# Patient Record
(demographics unavailable — no encounter records)

---

## 2025-01-29 NOTE — PROCEDURE
[de-identified] : Injection: Right wrist. Indication: De Quervain's tenosynovitis.  A discussion was had with the patient regarding this procedure and all questions were answered. All risks, benefits and alternatives were discussed. These included but were not limited to bleeding, infection, and allergic reaction. A timeout was done to ensure correct side and patient agreed to the procedure.  A [place] neva was created on the skin utilizing a plastic needle cap to neva the anticipated point of entry.   Alcohol was used to clean the skin, and Betadine was used to sterilize and prep the area over the radial aspect of the wrist overlying the first extensor tendon compartment encompassing the APL and EPB.  Ethyl chloride spray was then used as a topical anesthetic. A 25-gauge needle was used to inject 0.5cc of 0.25% bupivacaine and 1cc of 6mg/mL betamethasone into the first extensor compartment. A sterile bandage was then applied. The patient tolerated the procedure well and there were no complications.

## 2025-01-29 NOTE — DISCUSSION/SUMMARY
[de-identified] : Discussed findings of today's exam and possible causes of patient's pain.  Educated patient on their most probable diagnosis of Right wrist De Quervain's tenosynovitis.  Patient has a 4-month old baby and also works as an ultrasonographer.  Reviewed possible courses of treatment, and we collaboratively decided best course of treatment at this time will include cortisone injection today (see procedure note).  Informed the patient that the numbing medicine in today's injection will last for about 4-6 hours. The steroid that was injected will start to work in 1 to 2 days, peak at 1-2 weeks, and may last up to 4-6 weeks. Discussed with the patient the expected course of this injury, and the variable response to cortisone injections. Sometimes it is relieved with a single injection, while others require repeat injections.  We will wait and see how patient responds to this one cortisone injection, may consider repeat injection in 6 weeks if issue is improved but not fully resolved. Patient may consider consultation with hand/wrist surgeon to have a discussion about repeat cortisone injections, versus possible surgical intervention.  Patient advised to wear a thumb spica splint every evening and as tolerate during the day.  Based on the patient's symptoms today, no hand/wrist therapy will likely be needed.   Patient appreciates and agrees with current plan.  This note was generated using dragon medical dictation software.  A reasonable effort has been made for proofreading its contents, but typos may still remain.  If there are any questions or points of clarification needed please notify my office.

## 2025-01-29 NOTE — HISTORY OF PRESENT ILLNESS
[de-identified] : 32 F RHD presents with right wrist pain since 24, she endorses some mild symptoms prior but exacerbated on this day Patient works as an ultrasound technician at [employer] pain first started while performing ultrasounds She denies any LESLIE but has a  child that she holds, she does also report an instance of pushing her  for fun and felt pain radiate from her thumb up her arm She endorses right thumb pain that radiates up her radial forearm She was evaluated at urgent care 25, she was told that she has tendonitis and was given thumb spica splint She has been taking ibuprofin 800mg

## 2025-01-29 NOTE — PHYSICAL EXAM
[de-identified] : Constitutional: Well-nourished, well-developed, No acute distress Respiratory:  Good respiratory effort, no SOB Psychiatric: Pleasant and normal affect, alert and oriented x3 Musculoskeletal: normal except where as noted in regional exam  Right Wrist: APPEARANCE: no marked deformities, no swelling or malalignment POSITIVE TENDERNESS: radial wrist and thumb along compartment of APL and EPB NONTENDER: snuffbox, scapholunate, TFCC, FCU/FCR, ECU/ECRL/ECRB, radial styloid, CMC, MCP.  ROM: full although painful in ulnar deviation, and thumb flexion, otherwise painless.  RESISTIVE TESTING: Painful resisted radial deviation, thumb extension, and thumb abduction, mild pain with wrist ext, painless resisted flex and ulnar deviation.  SPECIAL TESTS: + Finkelstein's. neg Phalen's. neg reverse Phalen's. neg Cross's. neg leola test. neg TFCC grind. neg tinel's at the carpal tunnel Vasc: 2+ radial pulse Neuro: AIN, PIN, Ulnar nerve intact to motor Sensation: Intact to light touch throughout [de-identified] : 	 		 	 Exam requested by: SERENTIY GUSTAFSON [address] [facility] [id number] [facility] PHONE: [phone] Patient: DIAMOND DAY YOB: 1992 Phone: [phone] MRN: [id number] Acc: [id number] Date of Exam: 01-   EXAM:  X-RAY RIGHT WRIST MINIMUM 3 VIEWS  HISTORY:  Wrist pain.  TECHNIQUE:  PA, lateral, and oblique views of the right wrist.  COMPARISON: 2/5/2015.  IMPRESSION:   Bone density appears normal.  No visible fracture.  Mild, 3 mm widening of the scapholunate interval. The rest of the joint spaces are preserved.  No osteophytes or erosions.  No discrete lytic or blastic bone lesion. Neutral ulnar variance.   The soft tissue is unremarkable.    Thank you for the opportunity to participate in the care of this patient.     KATE ZARATE MD  - Electronically Signed: 01- 3:49 PM  Physician to Physician Direct Line is: [phone]

## 2025-02-20 NOTE — HISTORY OF PRESENT ILLNESS
[de-identified] : Patient referred by Dr. Robert for evaluation of thyroid nodule, suspicious for papillary carcinoma. Three years ago, patient was noted to have a thyroid nodule. Gynecologist recently noted increase in size. Patient denies thyroid dysfunction, dysphagia, change in voice or radiation exposure. Thyroid ultrasound January 2020: Right lobe 4.7 x 1.8 x 1.1 CM, left lobe 4.9 x 1.5 I. one CM with single isthmus nodule measuring 2.6 x 2.4 x 1.4 CM. Study. Biopsy March 2020 suspicious for papillary thyroid cancer, [place] category 5. 9/14/2020 total thyroidectomy with CND. path 2.5 cm with 2/3 LN positive.  denies dysphagia or change in voice, 10/28/20 given 99  [facility]OWDHURY. post treatment scan with min uptake thyroid bed. f/u CHOWDHURY scan 10/2021 with no uptake and min TG stimulation.   Neck ultrasound June 2022 no evidence of recurrence in thyroid bed, no suspicious lymph nodes. Repeat neck ultrasound August 2024 no recurrence.  Patient denies palpitations reports fatigue taking care of baby girl.  Patient currently on 112 mcg with blood work in October TSH elevated.  Denies change in dose at that time.  Patient denies recent illness.  I have reviewed all old and new data and available images.

## 2025-02-20 NOTE — PHYSICAL EXAM
[de-identified] : Well-healed cervical incision no cervical or supraclavicular adenopathy, trachea midline, no palpable masses [Normal] : no neck adenopathy [de-identified] : Skin:  normal appearance.  no rash, nodules, vesicles, or erythema, Musculoskeletal:  full range of motion and no deformities appreciated Neurological:  grossly intact Psychiatric:  oriented to person, place and time with appropriate affect

## 2025-04-09 NOTE — HISTORY OF PRESENT ILLNESS
[de-identified] : DIAMOND GREENE , 32 year female presents to office for follow-up regarding right wrist pain. She reports that a cortisone injection administered on 1/29/2025, provided temporary relief; however, the pain returned about one month ago. In addition to the recurrent pain, she has experienced a new onset of wrist locking over the past two weeks. She states the pain has become severe enough to wake her up some nights.

## 2025-04-09 NOTE — PHYSICAL EXAM
[de-identified] : Constitutional: Well-nourished, well-developed, No acute distress Respiratory:  Good respiratory effort, no SOB Psychiatric: Pleasant and normal affect, alert and oriented x3 Musculoskeletal: normal except where as noted in regional exam  Right Wrist: APPEARANCE: no marked deformities, no swelling or malalignment POSITIVE TENDERNESS: radial wrist and thumb along compartment of APL and EPB NONTENDER: snuffbox, scapholunate, TFCC, FCU/FCR, ECU/ECRL/ECRB, radial styloid, CMC, MCP.  ROM: full although painful in ulnar deviation, and thumb flexion, otherwise painless.  RESISTIVE TESTING: Painful resisted radial deviation, thumb extension, and thumb abduction, mild pain with wrist ext, painless resisted flex and ulnar deviation.  SPECIAL TESTS: + Finkelstein's. neg Phalen's. neg reverse Phalen's. neg Cross's. neg leola test. neg TFCC grind. neg tinel's at the carpal tunnel Vasc: 2+ radial pulse Neuro: AIN, PIN, Ulnar nerve intact to motor Sensation: Intact to light touch throughout

## 2025-04-09 NOTE — DISCUSSION/SUMMARY
[de-identified] : Discussed findings of today's exam and possible causes of patient's pain.  Educated patient on their most probable diagnosis of recurrent Right Wrist De Quervain's Tenosynovitis.  Reviewed possible courses of treatment, and we collaboratively decided best course of treatment at this time will include cortisone injection today (see procedure note).  Informed the patient that the numbing medicine in today's injection will last for about 4-6 hours. The steroid that was injected will start to work in 1 to 2 days, peak at 1-2 weeks, and may last up to 4-6 weeks. Discussed with the patient the expected course of this injury, and the variable response to cortisone injections. Sometimes it is relieved with a single injection, while others require repeat injections.  We will wait and see how patient responds to this repeat cortisone injection, if issue returns would recommend consultation with hand/wrist surgeon to have a discussion regarding surgical intervention.  Patient advised to wear a thumb spica splint every evening and as tolerate during the day.  Based on the patient's symptoms today, no hand/wrist therapy will likely be needed.  Patient appreciates and agrees with current plan.    This note was generated using dragon medical dictation software.  A reasonable effort has been made for proofreading its contents, but typos may still remain.  If there are any questions or points of clarification needed please notify my office.

## 2025-04-09 NOTE — PROCEDURE
[de-identified] : Injection: Right wrist. Indication: De Quervain's tenosynovitis.  A discussion was had with the patient regarding this procedure and all questions were answered. All risks, benefits and alternatives were discussed. These included but were not limited to bleeding, infection, and allergic reaction. A timeout was done to ensure correct side and patient agreed to the procedure.  A [place] neva was created on the skin utilizing a plastic needle cap to neva the anticipated point of entry.   Alcohol was used to clean the skin, and Betadine was used to sterilize and prep the area over the radial aspect of the wrist overlying the first extensor tendon compartment encompassing the APL and EPB.  Ethyl chloride spray was then used as a topical anesthetic. A 25-gauge needle was used to inject 0.5cc of 0.25% bupivacaine and 1cc of 6mg/mL betamethasone into the first extensor compartment. A sterile bandage was then applied. The patient tolerated the procedure well and there were no complications.